# Patient Record
Sex: FEMALE | Race: BLACK OR AFRICAN AMERICAN | NOT HISPANIC OR LATINO | Employment: FULL TIME | URBAN - METROPOLITAN AREA
[De-identification: names, ages, dates, MRNs, and addresses within clinical notes are randomized per-mention and may not be internally consistent; named-entity substitution may affect disease eponyms.]

---

## 2017-04-21 ENCOUNTER — EPISODE CHANGES (OUTPATIENT)
Dept: HEPATOLOGY | Facility: CLINIC | Age: 52
End: 2017-04-21

## 2017-04-24 ENCOUNTER — EPISODE CHANGES (OUTPATIENT)
Dept: HEPATOLOGY | Facility: CLINIC | Age: 52
End: 2017-04-24

## 2017-04-27 ENCOUNTER — EPISODE CHANGES (OUTPATIENT)
Dept: HEPATOLOGY | Facility: CLINIC | Age: 52
End: 2017-04-27

## 2017-04-27 ENCOUNTER — TELEPHONE (OUTPATIENT)
Dept: HEPATOLOGY | Facility: CLINIC | Age: 52
End: 2017-04-27

## 2017-04-27 DIAGNOSIS — Z86.19 HISTORY OF HEPATITIS C: Primary | ICD-10-CM

## 2017-04-27 LAB — HCV RNA QUANT PCR LOG: <15

## 2017-04-27 NOTE — TELEPHONE ENCOUNTER
HCV LAB REVIEW  Completed 8 weeks of Harvoni 10/21/15  Had SVR24 - 4/2016 4/21/17  HCV neg    pls tell pt HCV neg still, expected.  We do not need to check it anymore    Next appts:  pls remind pt she needs u/s & AFP 6/2017 - already scheduled

## 2017-06-07 ENCOUNTER — TELEPHONE (OUTPATIENT)
Dept: HEPATOLOGY | Facility: CLINIC | Age: 52
End: 2017-06-07

## 2017-06-07 NOTE — TELEPHONE ENCOUNTER
If this is all she can do at this time, this is fine.  There is a chance though that if her U/S shows anything concerning she may have to come to [facility] for additional eval.

## 2017-06-07 NOTE — TELEPHONE ENCOUNTER
Patient phoned stating that she needed to resched US and AFP appt.  She wants to know if testing can be done at [facility] in [place], [place] ([phone]) which is approximately 3 hours away.  Please advise.

## 2017-06-08 NOTE — TELEPHONE ENCOUNTER
Attempt made to reach patient.  Message from PA Scheuermann left on her VM and mailed to her.  Order faxed to [facility]'s admitting dept at [phone].

## 2018-04-25 ENCOUNTER — TELEPHONE (OUTPATIENT)
Dept: HEPATOLOGY | Facility: CLINIC | Age: 53
End: 2018-04-25

## 2018-04-25 DIAGNOSIS — K76.9 LIVER LESION: ICD-10-CM

## 2018-04-25 DIAGNOSIS — Z86.19 HISTORY OF HEPATITIS C: Primary | ICD-10-CM

## 2018-04-25 NOTE — TELEPHONE ENCOUNTER
----- Message from Amena Magallanes sent at 4/24/2018  9:02 AM CDT -----  Contact: pt      ----- Message -----  From: Renee Gu  Sent: 4/23/2018   4:17 PM  To: Hepatology Scheduling    Calling to schedule follow up., says she hasn't been seen in a while.    Best contact: [phone]

## 2018-04-25 NOTE — TELEPHONE ENCOUNTER
Pt needs u/s abdomen and afp and cmp, cbc, inr  pls schedule    if she comes here, schedule a visit.   If there's a closer [facility] that's also a good option.  Otherwise she can have it done locally and I'll call with results.

## 2018-04-25 NOTE — TELEPHONE ENCOUNTER
Michelle, seems pt was due for ultrasound and labs nothing has been done since she last saw you she states, we nede new orders and does she need to see you afer this testing or just have the testing done please advise thanks

## 2020-08-01 ENCOUNTER — NURSE TRIAGE (OUTPATIENT)
Dept: ADMINISTRATIVE | Facility: CLINIC | Age: 55
End: 2020-08-01

## 2020-08-01 NOTE — TELEPHONE ENCOUNTER
Reason for Disposition   [1] SEVERE weakness (i.e., unable to walk or barely able to walk, requires support) AND [2] new onset or worsening    Protocols used: NEUROLOGIC DEFICIT-A-AH    C/o severe weakness in both legs and right hand numbness. Advised her to call 911 now and she verbalized understanding.